# Patient Record
Sex: MALE | Race: WHITE | NOT HISPANIC OR LATINO | Employment: UNEMPLOYED | URBAN - METROPOLITAN AREA
[De-identification: names, ages, dates, MRNs, and addresses within clinical notes are randomized per-mention and may not be internally consistent; named-entity substitution may affect disease eponyms.]

---
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
## 2023-11-27 ENCOUNTER — OFFICE VISIT (OUTPATIENT)
Dept: URGENT CARE | Facility: CLINIC | Age: 23
End: 2023-11-27
Payer: COMMERCIAL

## 2023-11-27 VITALS
WEIGHT: 155 LBS | OXYGEN SATURATION: 99 % | HEART RATE: 84 BPM | BODY MASS INDEX: 20.99 KG/M2 | TEMPERATURE: 97.2 F | HEIGHT: 72 IN | RESPIRATION RATE: 16 BRPM

## 2023-11-27 DIAGNOSIS — A08.4 VIRAL GASTROENTERITIS: Primary | ICD-10-CM

## 2023-11-27 PROCEDURE — 99213 OFFICE O/P EST LOW 20 MIN: CPT | Performed by: PHYSICIAN ASSISTANT

## 2023-11-27 NOTE — PATIENT INSTRUCTIONS
Gastroenteritis   WHAT YOU NEED TO KNOW:   Gastroenteritis, or stomach flu, is an infection of the stomach and intestines. DISCHARGE INSTRUCTIONS:   Call 911 for any of the following: You have trouble breathing or a very fast pulse. Return to the emergency department if:   You see blood in your diarrhea. You cannot stop vomiting. You have not urinated for 12 hours. You feel like you are going to faint. Contact your healthcare provider if:   You have a fever. You continue to vomit or have diarrhea, even after treatment. You see worms in your diarrhea. Your mouth or eyes are dry. You are not urinating as much or as often. You have questions or concerns about your condition or care. Medicines:   Medicines  may be given to stop vomiting or diarrhea, decrease abdominal cramps, or treat an infection. Take your medicine as directed. Contact your healthcare provider if you think your medicine is not helping or if you have side effects. Tell your provider if you are allergic to any medicine. Keep a list of the medicines, vitamins, and herbs you take. Include the amounts, and when and why you take them. Bring the list or the pill bottles to follow-up visits. Carry your medicine list with you in case of an emergency. Manage your symptoms:   Drink liquids as directed. Ask your healthcare provider how much liquid to drink each day, and which liquids are best for you. You may also need to drink an oral rehydration solution (ORS). An ORS has the right amounts of sugar, salt, and minerals in water to replace body fluids. Eat bland foods. When you feel hungry, begin eating soft, bland foods. Examples are bananas, clear soup, potatoes, and applesauce. Do not have dairy products, alcohol, sugary drinks, or drinks with caffeine until you feel better. Rest as much as possible. Slowly start to do more each day when you begin to feel better.     Prevent the spread of gastroenteritis:  Gastroenteritis can spread easily. Keep yourself, your family, and your surroundings clean to help prevent the spread of gastroenteritis:  Wash your hands often. Use soap and water. Wash your hands after you use the bathroom, change a child's diapers, or sneeze. Wash your hands before you prepare or eat food. Clean surfaces and do laundry often. Wash your clothes and towels separately from the rest of the laundry. Clean surfaces in your home with antibacterial  or bleach. Clean food thoroughly and cook safely. Wash raw vegetables before you cook. Cook meat, fish, and eggs fully. Do not use the same dishes for raw meat as you do for other foods. Refrigerate any leftover food immediately. Be aware when you camp or travel. Drink only clean water. Do not drink from rivers or lakes unless you purify or boil the water first. When you travel, drink bottled water and do not add ice. Do not eat fruit that has not been peeled. Do not eat raw fish or meat that is not fully cooked. Follow up with your doctor as directed:  Write down your questions so you remember to ask them during your visits. © Copyright [employer] 2023 Information is for End User's use only and may not be sold, redistributed or otherwise used for commercial purposes. The above information is an  only. It is not intended as medical advice for individual conditions or treatments. Talk to your doctor, nurse or pharmacist before following any medical regimen to see if it is safe and effective for you.

## 2023-11-27 NOTE — LETTER
November 27, 2023     Patient: [place]   YOB: 2000   Date of Visit: 11/27/2023       To Whom it May Concern:    Gerardo Russo was seen in my clinic on 11/27/2023. He may return to work on 11/28/2023 . If you have any questions or concerns, please don't hesitate to call.          Sincerely,          Farzad Francois PA-C        CC: No Recipients

## 2023-11-30 NOTE — PROGRESS NOTES
[facility]        NAME: Porfirio Morris is a 26 y.o. male  : 2000    MRN: [id number]  DATE: 2023  TIME: 4:30 PM    Assessment and Plan   Viral gastroenteritis [A08.4]  1. Viral gastroenteritis              Patient Instructions     Patient has resolving gastroenteritis. Recommended fluids, brat diet advancing slowly as tolerated, and he was given a note for work today. Follow up with PCP in 3-5 days. Proceed to  ER if symptoms worsen. Chief Complaint     Chief Complaint   Patient presents with    Nausea     Pt reports nausea with episodes of diarrhea with onset of symptoms Tuesday night. States symptoms have since improved today. Reports needing a note for work for today. History of Present Illness       Patient presents with what he reports began almost 1 week ago as diarrhea which is his primary symptom along with nausea. He reports his symptoms are resolving but he missed work and needs a note. Nausea  Associated symptoms include nausea. Review of Systems   Review of Systems   Constitutional: Negative. HENT: Negative. Respiratory: Negative. Cardiovascular: Negative. Gastrointestinal:  Positive for diarrhea and nausea. Genitourinary: Negative. Current Medications     No current outpatient medications on file. Current Allergies     Allergies as of 2023    (No Known Allergies)            The following portions of the patient's history were reviewed and updated as appropriate: allergies, current medications, past family history, past medical history, past social history, past surgical history and problem list.     History reviewed. No pertinent past medical history. History reviewed. No pertinent surgical history. Family History   Problem Relation Age of Onset    No Known Problems Mother     No Known Problems Father          Medications have been verified.         Objective   Pulse 84   Temp (!) 97.2 °F (36.2 °C) Resp 16   Ht 6' (1.829 m)   Wt 70.3 kg (155 lb)   SpO2 99%   BMI 21.02 kg/m²   No LMP for male patient. Physical Exam     Physical Exam  Vitals reviewed. Constitutional:       General: He is not in acute distress. Appearance: He is well-developed. HENT:      Mouth/Throat:      Mouth: Mucous membranes are moist.      Pharynx: Oropharynx is clear. Cardiovascular:      Rate and Rhythm: Normal rate and regular rhythm. Heart sounds: Normal heart sounds. No murmur heard. Pulmonary:      Effort: Pulmonary effort is normal. No respiratory distress. Breath sounds: Normal breath sounds. Abdominal:      General: Bowel sounds are normal. There is no distension. Palpations: Abdomen is soft. Tenderness: There is no abdominal tenderness. Musculoskeletal:      Cervical back: Neck supple. Lymphadenopathy:      Cervical: No cervical adenopathy. Neurological:      Mental Status: He is alert and oriented to person, place, and time.

## 2024-03-12 ENCOUNTER — OFFICE VISIT (OUTPATIENT)
Dept: FAMILY MEDICINE CLINIC | Facility: CLINIC | Age: 24
End: 2024-03-12
Payer: COMMERCIAL

## 2024-03-12 VITALS
BODY MASS INDEX: 20.45 KG/M2 | DIASTOLIC BLOOD PRESSURE: 70 MMHG | OXYGEN SATURATION: 100 % | SYSTOLIC BLOOD PRESSURE: 110 MMHG | WEIGHT: 151 LBS | HEIGHT: 72 IN | TEMPERATURE: 96.3 F | HEART RATE: 57 BPM

## 2024-03-12 DIAGNOSIS — Z23 ENCOUNTER FOR IMMUNIZATION: ICD-10-CM

## 2024-03-12 DIAGNOSIS — F41.1 GENERALIZED ANXIETY DISORDER: ICD-10-CM

## 2024-03-12 DIAGNOSIS — Z13.6 SCREENING FOR CARDIOVASCULAR CONDITION: ICD-10-CM

## 2024-03-12 DIAGNOSIS — Z00.00 ANNUAL PHYSICAL EXAM: Primary | ICD-10-CM

## 2024-03-12 PROCEDURE — 90715 TDAP VACCINE 7 YRS/> IM: CPT

## 2024-03-12 PROCEDURE — 90471 IMMUNIZATION ADMIN: CPT

## 2024-03-12 PROCEDURE — 99214 OFFICE O/P EST MOD 30 MIN: CPT | Performed by: FAMILY MEDICINE

## 2024-03-12 PROCEDURE — 99385 PREV VISIT NEW AGE 18-39: CPT | Performed by: FAMILY MEDICINE

## 2024-03-12 NOTE — PROGRESS NOTES
atADULT ANNUAL PHYSICAL  [facility] PRIMARY CARE    NAME: Kaushik Rivera  AGE: 24 y.o. SEX: male  : 2000     DATE: 3/12/2024     Assessment and Plan:     Problem List Items Addressed This Visit          Behavioral Health    Generalized anxiety disorder    Relevant Medications    sertraline (ZOLOFT) 50 mg tablet    Other Relevant Orders    Ambulatory referral to Behavioral Health    TSH, 3rd generation with Free T4 reflex  FMLA forms and behavioral health assessment forms for [employer] were completed at today's [facility].   He has been out of work since 3/6/24 and plans to return on 24. Completed FMLA to reflect this and after , will complete FMLA for intermittent missed days 3 times monthly for 1 day each event.   Start zoloft 50 mg 1/2 tablet daily for 3 days then once daily  Recheck in 4 weeks.   Will also place referral to behavioural health and since there is wait list for [facility] counseling recommended he contact his insurance to get list of participating counselors and start calling around for availability.   Check tsh     Other Visit Diagnoses       Annual physical exam    -  Primary  Discussed diet and exercise  Reviewed immunization records and adacel ordered.   Screening lipid panel ordered    Encounter for immunization        Relevant Orders    TDAP VACCINE GREATER THAN OR EQUAL TO 8YO IM    Screening for cardiovascular condition        Relevant Orders    Lipid panel              Immunizations and preventive care screenings were discussed with patient today. Appropriate education was printed on patient's after visit summary.    Counseling:  Alcohol/drug use: discussed moderation in alcohol intake, the recommendations for healthy alcohol use, and avoidance of illicit drug use.  Dental Health: discussed importance of regular tooth brushing, flossing, and dental visits.  Injury prevention: discussed safety/seat belts, safety helmets,  "smoke detectors, carbon dioxide detectors, and smoking near bedding or upholstery.  Sexual health: discussed sexually transmitted diseases, partner selection, use of condoms, avoidance of unintended pregnancy, and contraceptive alternatives.  Exercise: the importance of regular exercise/physical activity was discussed. Recommend exercise 3-5 times per week for at least 30 minutes.          Return in about 4 weeks (around 4/9/2024) for  recheck anxiety, Recheck.     Chief Complaint:     Chief Complaint   Patient presents with   • Establish Care     Reason for FMLA is CINTIA. Has not been seen by  yet, it is in the process of getting a therapist.    • Physical Exam   • Anxiety        History of Present Illness:     Adult Annual Physical   Patient is a 24 year old male who is here as a new patient for a comprehensive physical exam. Is accompanied to visits by mother.   His previous PCP was at [facility].   Last PE was in 2019.   Records reviewed and there are no chronic medical problems listed for this patient.   Today, complaining of anxiety for which he brings along FMLA forms completion along with forms for Behavioral Health Assessment.   Never seen by provider for his anxiety nor is he seeing counselor.   Anxiety started at around age 19 or 20 around time of parents divorce. He recently moved to [place] and doing new tasks at work. Also some increase in family stress (father became homeless and is dragging him into the drama) which has aggravated patient's anxiety recently. Has been out of work since March 6 and plans to return on 5/1/24.   Racing thoughts, worries a lot about money. Overthinks things. At times will get tunnel vision and fogginess, feels nervous. Feels like \"butterflies in body\" at times. Some difficulty sleeping. No depression. When overthinking things, can get so bad he hyperventilates.   Is interested in starting mediation  He has never seen counselor.     CINTIA-7 Flowsheet " Screening    Flowsheet Row Most Recent Value   Over the last 2 weeks, how often have you been bothered by any of the following problems?    Feeling nervous, anxious, or on edge 1   Not being able to stop or control worrying 1   Worrying too much about different things 1   Trouble relaxing 1   Being so restless that it is hard to sit still 1   Becoming easily annoyed or irritable 1   Feeling afraid as if something awful might happen 1   CINTIA-7 Total Score 7        PHQ-2/9 Depression Screening    Little interest or pleasure in doing things: 1 - several days  Feeling down, depressed, or hopeless: 1 - several days  PHQ-2 Score: 2  PHQ-2 Interpretation: Negative depression screen         Is requesting completion of FMLA forms.     The patient reports problems - anxiety as noted above .    Diet and Physical Activity  Diet/Nutrition: well balanced diet.   Exercise:  lfiting at the gym .      Depression Screening  PHQ-2/9 Depression Screening    Little interest or pleasure in doing things: 1 - several days  Feeling down, depressed, or hopeless: 1 - several days  PHQ-2 Score: 2  PHQ-2 Interpretation: Negative depression screen       General Health  Sleep:  some difficulty with sleep due to his anxiety .   Hearing: normal - bilateral.  Vision: no vision problems and wears glasses. Last saw eye doctor a few years ago. No recent vision changes  Dental: no dental visits for >1 year.        Health  History of STDs?: no.    Advanced Care Planning  Do you have an advanced directive? no  Do you have a durable medical power of ? no  ACP document given to the patient? no     Review of Systems:     Review of Systems   Past Medical History:     Past Medical History:   Diagnosis Date   • Anxiety     started at age 19 or 20      Past Surgical History:     History reviewed. No pertinent surgical history.   Social History:     Social History     Socioeconomic History   • Marital status: Single     Spouse name: None   • Number of  children: None   • Years of education: None   • Highest education level: None   Occupational History   • None   Tobacco Use   • Smoking status: Never   • Smokeless tobacco: Never   Vaping Use   • Vaping status: Never Used   Substance and Sexual Activity   • Alcohol use: Not Currently   • Drug use: Never   • Sexual activity: Yes     Partners: Female   Other Topics Concern   • None   Social History Narrative    . Works for [employer]    Lives with mother     Social Determinants of Health     Financial Resource Strain: Not on file   Food Insecurity: Not on file   Transportation Needs: Not on file   Physical Activity: Not on file   Stress: Not on file   Social Connections: Not on file   Intimate Partner Violence: Not on file   Housing Stability: Not on file      Family History:     Family History   Problem Relation Age of Onset   • Anxiety disorder Mother    • Mental illness Father    • Alcohol abuse Father    • Prostate cancer Paternal Grandfather       Current Medications:     Current Outpatient Medications   Medication Sig Dispense Refill   • sertraline (ZOLOFT) 50 mg tablet Take 1 tablet (50 mg total) by mouth daily 30 tablet 5     No current facility-administered medications for this visit.      Allergies:     No Known Allergies   Physical Exam:     /70   Pulse 57   Temp (!) 96.3 °F (35.7 °C) (Tympanic)   Ht 6' (1.829 m)   Wt 68.5 kg (151 lb)   SpO2 100%   BMI 20.48 kg/m²     Physical Exam     Annmarie Ruvalcaba DO   [facility] PRIMARY CARE

## 2024-04-03 DIAGNOSIS — F41.1 GENERALIZED ANXIETY DISORDER: ICD-10-CM

## 2024-04-11 LAB
CHOLEST SERPL-MCNC: 131 MG/DL (ref 100–199)
CHOLEST/HDLC SERPL: 2 RATIO (ref 0–5)
HDLC SERPL-MCNC: 65 MG/DL
LDLC SERPL CALC-MCNC: 57 MG/DL (ref 0–99)
SL AMB VLDL CHOLESTEROL CALC: 9 MG/DL (ref 5–40)
TRIGL SERPL-MCNC: 36 MG/DL (ref 0–149)
TSH SERPL DL<=0.005 MIU/L-ACNC: 0.63 UIU/ML (ref 0.45–4.5)

## 2024-04-15 NOTE — PROGRESS NOTES
"Name: Kaushik Rivera      : 2000      MRN: [id number]  Encounter Provider: Annmarie Ruvalcaba DO  Encounter Date: 2024   Encounter department: [facility] PRIMARY CARE    Assessment & Plan     1. Generalized anxiety disorder           Subjective     HPI  Patient is a 24 year old male who is being seen today for f/u on his anxiety.     He was seen here as a new patient on 3/12/24 at which time patient was having significant anxiety. Anxiety had been a longstanding issue for patient starting at around age 19 or 20 around time of his parents divorce. His stress was recently aggravated by recent move/job tasks and family stressors.     Had been out of work since  for his anxiety and planned to return on 24. He had requested FMLA forms be completed.   (Racing thoughts, worries a lot about money. Overthinks things. At times will get tunnel vision and fogginess, feels nervous. Feels like \"butterflies in body\" at times. Some difficulty sleeping. No depression. When overthinking things, can get so bad he hyperventilates. )    Patient was started on zoloft 50 mg.    Past Medical History:   Diagnosis Date   • Anxiety     started at age 19 or 20     No past surgical history on file.  Family History   Problem Relation Age of Onset   • Anxiety disorder Mother    • Mental illness Father    • Alcohol abuse Father    • Prostate cancer Paternal Grandfather      Social History     Socioeconomic History   • Marital status: Single     Spouse name: Not on file   • Number of children: Not on file   • Years of education: Not on file   • Highest education level: Not on file   Occupational History   • Not on file   Tobacco Use   • Smoking status: Never   • Smokeless tobacco: Never   Vaping Use   • Vaping status: Never Used   Substance and Sexual Activity   • Alcohol use: Not Currently   • Drug use: Never   • Sexual activity: Yes     Partners: Female   Other Topics Concern   • Not on file   Social " History Narrative    . Works for [employer]    Lives with mother     Social Determinants of Health     Financial Resource Strain: Not on file   Food Insecurity: Not on file   Transportation Needs: Not on file   Physical Activity: Not on file   Stress: Not on file   Social Connections: Not on file   Intimate Partner Violence: Not on file   Housing Stability: Not on file     Current Outpatient Medications on File Prior to Visit   Medication Sig   • sertraline (ZOLOFT) 50 mg tablet TAKE 1 TABLET BY MOUTH EVERY DAY     No Known Allergies  Immunization History   Administered Date(s) Administered   • DTaP 5 2002, 2002, 2002, 07/01/2003, 05/30/2006   • H1N1, All Formulations 11/03/2009, 12/08/2009   • HPV9 09/03/2019, 11/06/2019, 03/10/2020   • Hep B, adult 2002, 2002, 04/03/2003   • Hib (PRP-OMP) 2002, 2002, 01/01/2003   • IPV 2002, 2002, 05/30/2006, 06/16/2007   • MMR 04/03/2003, 08/04/2007   • Meningococcal MCV4P 04/02/2018   • Meningococcal, Unknown Serogroups 08/22/2013   • Pneumococcal Conjugate 13-Valent 2002, 2002, 07/01/2003   • Tdap 08/22/2013, 03/12/2024   • Varicella 01/13/2004, 03/07/2012       Objective     There were no vitals taken for this visit.    Physical Exam  Annmarie Ruvalcaba DO

## 2024-04-16 ENCOUNTER — TELEMEDICINE (OUTPATIENT)
Dept: FAMILY MEDICINE CLINIC | Facility: CLINIC | Age: 24
End: 2024-04-16
Payer: COMMERCIAL

## 2024-04-16 DIAGNOSIS — F41.1 GENERALIZED ANXIETY DISORDER: Primary | ICD-10-CM

## 2024-04-16 PROCEDURE — 99213 OFFICE O/P EST LOW 20 MIN: CPT | Performed by: FAMILY MEDICINE

## 2024-04-16 NOTE — PROGRESS NOTES
Virtual Regular Visit    Verification of patient location:    Patient is located at Home in the following state in which I hold an active license [place]      Assessment/Plan:    Problem List Items Addressed This Visit          Behavioral Health    Generalized anxiety disorder - Primary   Improved since starting zoloft   Tolerating well with no side effects  Will continue this along with his counseling.   F/u 6 months.   Call sooner if any worsening in his anxiety         Reason for visit is   Chief Complaint   Patient presents with   • Virtual Regular Visit          Encounter provider Annmarie Ruvalcaba DO    Provider located at [facility] CARE  [address] [place] [address] [place] [address]      Recent Visits  No visits were found meeting these conditions.  Showing recent visits within past 7 days and meeting all other requirements  Today's Visits  Date Type Provider Dept   04/16/24 Telemedicine Annmarie Ruvalcaba DO [facility]   Showing today's visits and meeting all other requirements  Future Appointments  No visits were found meeting these conditions.  Showing future appointments within next 150 days and meeting all other requirements       The patient was identified by name and date of birth. Kaushik Rivera was informed that this is a telemedicine visit and that the visit is being conducted through the Epic Embedded platform. He agrees to proceed..  My office door was closed. No one else was in the room.  He acknowledged consent and understanding of privacy and security of the video platform. The patient has agreed to participate and understands they can discontinue the visit at any time.    Patient is aware this is a billable service.     Subjective  Kaushik Rivera is a 24 y.o. male who is being seen today for f/u on his anxiety.     He was seen here as a new patient on 3/12/24 at which time patient was having significant anxiety.  "Anxiety had been a longstanding issue for patient starting at around age 19 or 20 (around time of his parents divorce). His stress was recently aggravated by recent move/job tasks and family stressors.     Had been out of work since March 6 for his anxiety and planned to return on 5/1/24. He had requested FMLA forms be completed.   (Racing thoughts, worries a lot about money. Overthinks things. At times will get tunnel vision and fogginess, feels nervous. Feels like \"butterflies in body\" at times. Some difficulty sleeping. No depression. When overthinking things, can get so bad he hyperventilates. )    Patient was started on zoloft 50 mg. .    Today, states that the medication seems to be working well for him. He is no longer having any racing thoughts. Sleeping well.   No side effects currently. Initially had some fatigue and headache which resolved.   Plans to start back to work beginning of may.   Seeing therapist.--. Selena Lang at [facility]. Has been to 4 visits thus far and finds this helpful      HPI     Past Medical History:   Diagnosis Date   • Anxiety     started at age 19 or 20       History reviewed. No pertinent surgical history.    Current Outpatient Medications   Medication Sig Dispense Refill   • sertraline (ZOLOFT) 50 mg tablet TAKE 1 TABLET BY MOUTH EVERY DAY 90 tablet 2     No current facility-administered medications for this visit.        No Known Allergies    Review of Systems    Video Exam    There were no vitals filed for this visit.    Physical Exam  Constitutional:       General: He is not in acute distress.     Appearance: Normal appearance. He is not ill-appearing, toxic-appearing or diaphoretic.   HENT:      Head: Normocephalic and atraumatic.   Eyes:      Conjunctiva/sclera: Conjunctivae normal.   Pulmonary:      Effort: Pulmonary effort is normal. No respiratory distress.   Neurological:      Mental Status: He is alert.   Psychiatric:         Mood and Affect: Mood normal.      "     Visit Time  Total Visit Duration: 10

## 2024-04-25 ENCOUNTER — TELEPHONE (OUTPATIENT)
Age: 24
End: 2024-04-25

## 2024-04-25 NOTE — TELEPHONE ENCOUNTER
Patient is requesting a back to work note for him to return May 1. He has been out since March 6 and would like to return. He said he would go back with no restrictions if that is appropriate.    If the note is able to be written, please upload to [employer] and let patient know letter is ready    Thank you!

## 2024-04-29 NOTE — TELEPHONE ENCOUNTER
Pt. Called back as requested. Notified that return to work note is in My Chart. And he is good to go.

## 2024-10-29 ENCOUNTER — OFFICE VISIT (OUTPATIENT)
Dept: FAMILY MEDICINE CLINIC | Facility: CLINIC | Age: 24
End: 2024-10-29
Payer: COMMERCIAL

## 2024-10-29 VITALS
DIASTOLIC BLOOD PRESSURE: 61 MMHG | BODY MASS INDEX: 20.99 KG/M2 | HEART RATE: 60 BPM | SYSTOLIC BLOOD PRESSURE: 120 MMHG | OXYGEN SATURATION: 98 % | TEMPERATURE: 97.9 F | WEIGHT: 154.8 LBS

## 2024-10-29 DIAGNOSIS — F41.1 GENERALIZED ANXIETY DISORDER: Primary | ICD-10-CM

## 2024-10-29 PROCEDURE — 99214 OFFICE O/P EST MOD 30 MIN: CPT | Performed by: FAMILY MEDICINE

## 2024-10-29 RX ORDER — HYDROXYZINE HYDROCHLORIDE 25 MG/1
25 TABLET, FILM COATED ORAL EVERY 6 HOURS PRN
Qty: 10 TABLET | Refills: 0 | Status: SHIPPED | OUTPATIENT
Start: 2024-10-29

## 2024-10-29 RX ORDER — SERTRALINE HYDROCHLORIDE 100 MG/1
100 TABLET, FILM COATED ORAL DAILY
Qty: 30 TABLET | Refills: 5 | Status: SHIPPED | OUTPATIENT
Start: 2024-10-29

## 2024-10-29 NOTE — ASSESSMENT & PLAN NOTE
CINTIA 7 9 today  Some irritability and difficulty sleeping in last month  Continue counseling  Increase zoloft from 50 to 100  Rx for hydroxzyine 25 to take at hs for next few nights  F/u 1 month   Orders:    sertraline (ZOLOFT) 100 mg tablet; Take 1 tablet (100 mg total) by mouth daily    hydrOXYzine HCL (ATARAX) 25 mg tablet; Take 1 tablet (25 mg total) by mouth every 6 (six) hours as needed for itching

## 2024-10-29 NOTE — PROGRESS NOTES
Ambulatory Visit  Name: Kaushik Rivera      : 2000      MRN: [id number]  Encounter Provider: Annmarie Ruvalcaba DO  Encounter Date: 10/29/2024   Encounter department: [facility] PRIMARY CARE    Assessment & Plan  Generalized anxiety disorder  CINTIA 7 9 today  Some irritability and difficulty sleeping in last month  Continue counseling  Increase zoloft from 50 to 100  Rx for hydroxzyine 25 to take at hs for next few nights  F/u 1 month   Orders:    sertraline (ZOLOFT) 100 mg tablet; Take 1 tablet (100 mg total) by mouth daily    hydrOXYzine HCL (ATARAX) 25 mg tablet; Take 1 tablet (25 mg total) by mouth every 6 (six) hours as needed for itching         History of Present Illness     HPI  Patient is a 24 year old male who is being seen today for f/u on his anxiety.   Taking zoloft 50 mg daily    He feels like his anxiety has really improved overall. States that he has been having trouble sleeping at night for the last month. Also feeling a little irritable at times.   Takes his zoloft in AM.   Has tried melatonin in past and does recall it helping him.     Seeing counselor.   Declines flu vaccine today      CINTIA-7 Flowsheet Screening      Flowsheet Row Most Recent Value   Over the last two weeks, how often have you been bothered by the following problems?     Feeling nervous, anxious, or on edge 1   Not being able to stop or control worrying 2   Worrying too much about different things 1   Trouble relaxing  1   Being so restless that it's hard to sit still 1   Becoming easily annoyed or irritable  3   Feeling afraid as if something awful might happen 0   How difficult have these problems made it for you to do your work, take care of things at home, or get along with other people?  Somewhat difficult   CINTIA Score  9            Review of Systems  Past Medical History:   Diagnosis Date    Anxiety     started at age 19 or 20     History reviewed. No pertinent surgical history.  Family History    Problem Relation Age of Onset    Anxiety disorder Mother     Mental illness Father     Alcohol abuse Father     Prostate cancer Paternal Grandfather      Social History     Tobacco Use    Smoking status: Never    Smokeless tobacco: Never   Vaping Use    Vaping status: Never Used   Substance and Sexual Activity    Alcohol use: Not Currently    Drug use: Never    Sexual activity: Yes     Partners: Female     Current Outpatient Medications on File Prior to Visit   Medication Sig    sertraline (ZOLOFT) 50 mg tablet TAKE 1 TABLET BY MOUTH EVERY DAY     No Known Allergies  Immunization History   Administered Date(s) Administered    DTaP 5 2002, 2002, 2002, 07/01/2003, 05/30/2006    H1N1, All Formulations 11/03/2009, 12/08/2009    HPV9 09/03/2019, 11/06/2019, 03/10/2020    Hep B, adult 2002, 2002, 04/03/2003    Hib (PRP-OMP) 2002, 2002, 01/01/2003    IPV 2002, 2002, 05/30/2006, 06/16/2007    MMR 04/03/2003, 08/04/2007    Meningococcal MCV4P 04/02/2018    Meningococcal, Unknown Serogroups 08/22/2013    Pneumococcal Conjugate 13-Valent 2002, 2002, 07/01/2003    Tdap 08/22/2013, 03/12/2024    Varicella 01/13/2004, 03/07/2012     Objective     /61   Pulse 60   Temp 97.9 °F (36.6 °C)   Wt 70.2 kg (154 lb 12.8 oz)   SpO2 98%   BMI 20.99 kg/m²     Physical Exam

## 2024-11-25 DIAGNOSIS — F41.1 GENERALIZED ANXIETY DISORDER: ICD-10-CM

## 2024-11-26 RX ORDER — SERTRALINE HYDROCHLORIDE 100 MG/1
100 TABLET, FILM COATED ORAL DAILY
Qty: 90 TABLET | Refills: 2 | Status: SHIPPED | OUTPATIENT
Start: 2024-11-26 | End: 2024-12-02

## 2024-12-01 NOTE — PROGRESS NOTES
Name: Kaushik Rivera      : 2000      MRN: [id number]  Encounter Provider: Annmarie Ruvalcaba DO  Encounter Date: 2024   Encounter department: [facility] PRIMARY CARE    Assessment & Plan  Generalized anxiety disorder  Stopped the zoloft due to side effects at higher dose.   Will start buspar 5 mg bid and have him continue counseling.   Recheck in about a month  Orders:  •  busPIRone (BUSPAR) 5 mg tablet; Take 1 tablet (5 mg total) by mouth 2 (two) times a day         History of Present Illness     HPI  Patient is a 24 year old male with anxiety who is being seen today for f/u   He was seen here last on 10/29/24 at which time CINTIA 7  score was 9   At time of last OV, he reported some irritability and difficulty sleeping in last month  Recommended he continue counseling and his zoloft  was increased from 50 to 100  He was also given rx for hydroxzyine 25 to take at hs prn    He states that he stopped taking the zoloft one week ago--felt that it caused him to have agitation and diarrhea.   Atarax was helpful but only took this when he couldn't sleep.   Still feeling like anxiety is up and down. Has a lot going on at home.   Seeing counselor and going monthly and planning to increase to bid ([facility])       CINTIA-7 Flowsheet Screening      Flowsheet Row Most Recent Value   Over the last two weeks, how often have you been bothered by the following problems?     Feeling nervous, anxious, or on edge 1   Not being able to stop or control worrying 1   Worrying too much about different things 1   Trouble relaxing  1   Being so restless that it's hard to sit still 1   Becoming easily annoyed or irritable  2   Feeling afraid as if something awful might happen 1   How difficult have these problems made it for you to do your work, take care of things at home, or get along with other people?  Somewhat difficult   CINTIA Score  8            Review of Systems  Past Medical History:    Diagnosis Date   • Anxiety     started at age 19 or 20     History reviewed. No pertinent surgical history.  Family History   Problem Relation Age of Onset   • Anxiety disorder Mother    • Mental illness Father    • Alcohol abuse Father    • Prostate cancer Paternal Grandfather      Social History     Tobacco Use   • Smoking status: Never   • Smokeless tobacco: Never   Vaping Use   • Vaping status: Never Used   Substance and Sexual Activity   • Alcohol use: Not Currently   • Drug use: Never   • Sexual activity: Yes     Partners: Female     Current Outpatient Medications on File Prior to Visit   Medication Sig   • [DISCONTINUED] hydrOXYzine HCL (ATARAX) 25 mg tablet Take 1 tablet (25 mg total) by mouth every 6 (six) hours as needed for itching (Patient not taking: Reported on 12/2/2024)   • [DISCONTINUED] sertraline (ZOLOFT) 100 mg tablet TAKE 1 TABLET BY MOUTH EVERY DAY (Patient not taking: Reported on 12/2/2024)     No Known Allergies  Immunization History   Administered Date(s) Administered   • DTaP 5 2002, 2002, 2002, 07/01/2003, 05/30/2006   • H1N1, All Formulations 11/03/2009, 12/08/2009   • HPV9 09/03/2019, 11/06/2019, 03/10/2020   • Hep B, adult 2002, 2002, 04/03/2003   • Hib (PRP-OMP) 2002, 2002, 01/01/2003   • IPV 2002, 2002, 05/30/2006, 06/16/2007   • MMR 04/03/2003, 08/04/2007   • Meningococcal MCV4P 04/02/2018   • Meningococcal, Unknown Serogroups 08/22/2013   • Pneumococcal Conjugate 13-Valent 2002, 2002, 07/01/2003   • Tdap 08/22/2013, 03/12/2024   • Varicella 01/13/2004, 03/07/2012     Objective   /50 (BP Location: Left arm, Patient Position: Sitting, Cuff Size: Adult)   Pulse 74   Temp (!) 97.4 °F (36.3 °C) (Tympanic)   Resp 18   Ht 6' (1.829 m)   Wt 71.2 kg (157 lb)   SpO2 98%   BMI 21.29 kg/m²     Physical Exam  Vitals and nursing note reviewed.   Constitutional:       General: He is not in acute distress.      Appearance: Normal appearance. He is not ill-appearing, toxic-appearing or diaphoretic.   Cardiovascular:      Rate and Rhythm: Normal rate and regular rhythm.      Heart sounds: No murmur heard.  Pulmonary:      Effort: Pulmonary effort is normal.      Breath sounds: Normal breath sounds.   Abdominal:      General: Abdomen is flat. Bowel sounds are normal.      Palpations: Abdomen is soft.   Musculoskeletal:      Cervical back: Neck supple.      Right lower leg: No edema.      Left lower leg: No edema.   Lymphadenopathy:      Cervical: No cervical adenopathy.   Neurological:      General: No focal deficit present.      Mental Status: He is alert and oriented to person, place, and time.   Psychiatric:         Mood and Affect: Mood normal.

## 2024-12-02 ENCOUNTER — OFFICE VISIT (OUTPATIENT)
Dept: FAMILY MEDICINE CLINIC | Facility: CLINIC | Age: 24
End: 2024-12-02
Payer: COMMERCIAL

## 2024-12-02 VITALS
DIASTOLIC BLOOD PRESSURE: 50 MMHG | TEMPERATURE: 97.4 F | RESPIRATION RATE: 18 BRPM | BODY MASS INDEX: 21.26 KG/M2 | HEART RATE: 74 BPM | SYSTOLIC BLOOD PRESSURE: 110 MMHG | WEIGHT: 157 LBS | OXYGEN SATURATION: 98 % | HEIGHT: 72 IN

## 2024-12-02 DIAGNOSIS — F41.1 GENERALIZED ANXIETY DISORDER: Primary | ICD-10-CM

## 2024-12-02 PROCEDURE — 99213 OFFICE O/P EST LOW 20 MIN: CPT | Performed by: FAMILY MEDICINE

## 2024-12-02 RX ORDER — HYDROXYZINE HYDROCHLORIDE 25 MG/1
25 TABLET, FILM COATED ORAL EVERY 6 HOURS PRN
Start: 2024-12-02

## 2024-12-02 RX ORDER — BUSPIRONE HYDROCHLORIDE 5 MG/1
5 TABLET ORAL 2 TIMES DAILY
Qty: 60 TABLET | Refills: 5 | Status: SHIPPED | OUTPATIENT
Start: 2024-12-02

## 2024-12-02 NOTE — ASSESSMENT & PLAN NOTE
Stopped the zoloft due to side effects at higher dose.   Will start buspar 5 mg bid and have him continue counseling.   Recheck in about a month  Orders:    busPIRone (BUSPAR) 5 mg tablet; Take 1 tablet (5 mg total) by mouth 2 (two) times a day

## 2024-12-29 DIAGNOSIS — F41.1 GENERALIZED ANXIETY DISORDER: ICD-10-CM

## 2024-12-30 RX ORDER — BUSPIRONE HYDROCHLORIDE 5 MG/1
5 TABLET ORAL 2 TIMES DAILY
Qty: 180 TABLET | Refills: 1 | Status: SHIPPED | OUTPATIENT
Start: 2024-12-30

## 2025-01-12 DIAGNOSIS — F41.1 GENERALIZED ANXIETY DISORDER: ICD-10-CM

## 2025-03-16 DIAGNOSIS — F41.1 GENERALIZED ANXIETY DISORDER: ICD-10-CM

## 2025-03-31 ENCOUNTER — APPOINTMENT (OUTPATIENT)
Dept: URGENT CARE | Facility: CLINIC | Age: 25
End: 2025-03-31

## 2025-04-01 ENCOUNTER — TELEPHONE (OUTPATIENT)
Dept: FAMILY MEDICINE CLINIC | Facility: CLINIC | Age: 25
End: 2025-04-01

## 2025-04-01 NOTE — TELEPHONE ENCOUNTER
Patient informed PHY needed.    Because he can not return to work until forms are complete we scheduled him at [facility] on Monday 4/7.     Will call if something opens up earlier at this office.

## 2025-04-01 NOTE — TELEPHONE ENCOUNTER
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    Notified patient that appointment may be needed. Last OV 12/24 for anxiety.    Pt is overdue for PHY.    Form in to sign bin. Please advise if appointment is needed to complete form.

## 2025-04-03 ENCOUNTER — OFFICE VISIT (OUTPATIENT)
Dept: FAMILY MEDICINE CLINIC | Facility: CLINIC | Age: 25
End: 2025-04-03
Payer: COMMERCIAL

## 2025-04-03 VITALS
OXYGEN SATURATION: 99 % | BODY MASS INDEX: 20.91 KG/M2 | HEART RATE: 57 BPM | DIASTOLIC BLOOD PRESSURE: 57 MMHG | HEIGHT: 73 IN | SYSTOLIC BLOOD PRESSURE: 111 MMHG | WEIGHT: 157.8 LBS

## 2025-04-03 DIAGNOSIS — F41.1 GENERALIZED ANXIETY DISORDER: ICD-10-CM

## 2025-04-03 DIAGNOSIS — Z00.00 ANNUAL PHYSICAL EXAM: Primary | ICD-10-CM

## 2025-04-03 PROCEDURE — 99395 PREV VISIT EST AGE 18-39: CPT | Performed by: FAMILY MEDICINE

## 2025-04-03 NOTE — PROGRESS NOTES
"Adult Annual Physical  Name: Kaushik Rivera      : 2000      MRN: [id number]  Encounter Provider: Annmarie Ruvalcaba DO  Encounter Date: 4/3/2025   Encounter department: [facility]    :  Assessment & Plan  Annual physical exam  Healthy young adult male  Discussed importance of diet and exercise  Had screening lipid panel last year and was normal.   Immunizations are up to date.        Generalized anxiety disorder  Zoloft--caused increased agitation and diarrhea at dose higher than 50 mg   He is doing great on the buspar. Feels great.   Does not need refill.   Did complete form for occupational medicine and gave this to him to submit today         Annual physical exam               Preventive Screenings:    - Prostate cancer screening: screening not indicated     Immunizations:  - Immunizations due: Influenza and HPV (Gardasil 9)         History of Present Illness     Adult Annual Physical:  Patient presents for annual physical.     Diet and Physical Activity:  - Diet/Nutrition: no special diet.  - Exercise:. occasional walking    Depression Screening:  - PHQ-2 Score: 0    General Health:  - Sleep: sleeps well.  - Hearing: normal hearing right ear and normal hearing left ear.  - Vision: no vision problems and wears glasses.  - Dental: regular dental visits.    Patient is a 25 year old male with anxiety who is being seen today for annual physical.   His immunizations were reviewed and are up to date.   Had lipid panel done for screening purposes last year and was normal.   He needs \"medical response form\" from [facility].     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 Had this done in [place].     Has anxiety for which he is taking buspar 5 mg bid.   This is working well for him.   CINTIA-7 Flowsheet Screening      Flowsheet Row Most Recent Value   Over the last two weeks, how often have you been bothered by the following problems?   " "  Feeling nervous, anxious, or on edge 1   Not being able to stop or control worrying 0   Worrying too much about different things 1   Trouble relaxing  0   Being so restless that it's hard to sit still 1   Becoming easily annoyed or irritable  1   Feeling afraid as if something awful might happen 0   How difficult have these problems made it for you to do your work, take care of things at home, or get along with other people?  Not difficult at all   CINTIA Score  4              Review of Systems      Objective   /57   Pulse 57   Ht 6' 0.5\" (1.842 m)   Wt 71.6 kg (157 lb 12.8 oz)   SpO2 99%   BMI 21.11 kg/m²     Physical Exam  Vitals and nursing note reviewed.   Constitutional:       General: He is not in acute distress.     Appearance: Normal appearance. He is not ill-appearing, toxic-appearing or diaphoretic.   HENT:      Head: Normocephalic and atraumatic.      Right Ear: Tympanic membrane normal.      Left Ear: Tympanic membrane normal.      Nose: Nose normal.      Mouth/Throat:      Mouth: Mucous membranes are moist.   Eyes:      Extraocular Movements: Extraocular movements intact.      Conjunctiva/sclera: Conjunctivae normal.      Pupils: Pupils are equal, round, and reactive to light.   Cardiovascular:      Rate and Rhythm: Normal rate and regular rhythm.      Heart sounds: No murmur heard.  Pulmonary:      Effort: Pulmonary effort is normal.      Breath sounds: Normal breath sounds.   Abdominal:      General: Abdomen is flat. Bowel sounds are normal.      Palpations: Abdomen is soft.      Tenderness: There is no abdominal tenderness.   Musculoskeletal:         General: No deformity. Normal range of motion.      Cervical back: Normal range of motion and neck supple.      Right lower leg: No edema.      Left lower leg: No edema.   Lymphadenopathy:      Cervical: No cervical adenopathy.   Skin:     General: Skin is warm and dry.      Findings: No rash.   Neurological:      General: No focal deficit " present.      Mental Status: He is alert and oriented to person, place, and time.      Motor: No weakness.      Deep Tendon Reflexes: Reflexes normal.   Psychiatric:         Mood and Affect: Mood normal.

## 2025-04-03 NOTE — PATIENT INSTRUCTIONS
"Patient Education     Routine physical for adults   The Basics   Written by the doctors and editors at [facility]   What is a physical? -- A physical is a routine visit, or \"check-up,\" with your doctor. You might also hear it called a \"wellness visit\" or \"preventive visit.\"  During each visit, the doctor will:   Ask about your physical and mental health   Ask about your habits, behaviors, and lifestyle   Do an exam   Give you vaccines if needed   Talk to you about any medicines you take   Give advice about your health   Answer your questions  Getting regular check-ups is an important part of taking care of your health. It can help your doctor find and treat any problems you have. But it's also important for preventing health problems.  A routine physical is different from a \"sick visit.\" A sick visit is when you see a doctor because of a health concern or problem. Since physicals are scheduled ahead of time, you can think about what you want to ask the doctor.  How often should I get a physical? -- It depends on your age and health. In general, for people age 21 years and older:   If you are younger than 50 years, you might be able to get a physical every 3 years.   If you are 50 years or older, your doctor might recommend a physical every year.  If you have an ongoing health condition, like diabetes or high blood pressure, your doctor will probably want to see you more often.  What happens during a physical? -- In general, each visit will include:   Physical exam - The doctor or nurse will check your height, weight, heart rate, and blood pressure. They will also look at your eyes and ears. They will ask about how you are feeling and whether you have any symptoms that bother you.   Medicines - It's a good idea to bring a list of all the medicines you take to each doctor visit. Your doctor will talk to you about your medicines and answer any questions. Tell them if you are having any side effects that bother you. You " "should also tell them if you are having trouble paying for any of your medicines.   Habits and behaviors - This includes:   Your diet   Your exercise habits   Whether you smoke, drink alcohol, or use drugs   Whether you are sexually active   Whether you feel safe at home  Your doctor will talk to you about things you can do to improve your health and lower your risk of health problems. They will also offer help and support. For example, if you want to quit smoking, they can give you advice and might prescribe medicines. If you want to improve your diet or get more physical activity, they can help you with this, too.   Lab tests, if needed - The tests you get will depend on your age and situation. For example, your doctor might want to check your:   Cholesterol   Blood sugar   Iron level   Vaccines - The recommended vaccines will depend on your age, health, and what vaccines you already had. Vaccines are very important because they can prevent certain serious or deadly infections.   Discussion of screening - \"Screening\" means checking for diseases or other health problems before they cause symptoms. Your doctor can recommend screening based on your age, risk, and preferences. This might include tests to check for:   Cancer, such as breast, prostate, cervical, ovarian, colorectal, prostate, lung, or skin cancer   Sexually transmitted infections, such as chlamydia and gonorrhea   Mental health conditions like depression and anxiety  Your doctor will talk to you about the different types of screening tests. They can help you decide which screenings to have. They can also explain what the results might mean.   Answering questions - The physical is a good time to ask the doctor or nurse questions about your health. If needed, they can refer you to other doctors or specialists, too.  Adults older than 65 years often need other care, too. As you get older, your doctor will talk to you about:   How to prevent falling at " home   Hearing or vision tests   Memory testing   How to take your medicines safely   Making sure that you have the help and support you need at home  All topics are updated as new evidence becomes available and our peer review process is complete.  This topic retrieved from [employer] on: May 02, 2024.  Topic 579881 Version 1.0  Release: 32.4.3 - C32.122  © 2024 UpToDate, Inc. and/or its affiliates. All rights reserved.  Consumer Information Use and Disclaimer   Disclaimer: This generalized information is a limited summary of diagnosis, treatment, and/or medication information. It is not meant to be comprehensive and should be used as a tool to help the user understand and/or assess potential diagnostic and treatment options. It does NOT include all information about conditions, treatments, medications, side effects, or risks that may apply to a specific patient. It is not intended to be medical advice or a substitute for the medical advice, diagnosis, or treatment of a health care provider based on the health care provider's examination and assessment of a patient's specific and unique circumstances. Patients must speak with a health care provider for complete information about their health, medical questions, and treatment options, including any risks or benefits regarding use of medications. This information does not endorse any treatments or medications as safe, effective, or approved for treating a specific patient. UpToDate, Inc. and its affiliates disclaim any warranty or liability relating to this information or the use thereof.The use of this information is governed by the Terms of Use, available at https://www.woltersMOAECuwer.com/en/know/clinical-effectiveness-terms. 2024© UpToDate, Inc. and its affiliates and/or licensors. All rights reserved.  Copyright   © 2024 UpToDate, Inc. and/or its affiliates. All rights reserved.

## 2025-04-03 NOTE — ASSESSMENT & PLAN NOTE
Zoloft--caused increased agitation and diarrhea at dose higher than 50 mg   He is doing great on the buspar. Feels great.   Does not need refill.   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

## 2025-07-06 DIAGNOSIS — F41.1 GENERALIZED ANXIETY DISORDER: ICD-10-CM

## 2025-07-07 RX ORDER — BUSPIRONE HYDROCHLORIDE 5 MG/1
5 TABLET ORAL 2 TIMES DAILY
Qty: 180 TABLET | Refills: 1 | Status: SHIPPED | OUTPATIENT
Start: 2025-07-07